# Patient Record
Sex: FEMALE | Race: WHITE
[De-identification: names, ages, dates, MRNs, and addresses within clinical notes are randomized per-mention and may not be internally consistent; named-entity substitution may affect disease eponyms.]

---

## 2021-08-17 ENCOUNTER — HOSPITAL ENCOUNTER (OUTPATIENT)
Dept: HOSPITAL 96 - M.LAB | Age: 71
End: 2021-08-17
Attending: NURSE PRACTITIONER
Payer: COMMERCIAL

## 2021-08-17 DIAGNOSIS — I10: Primary | ICD-10-CM

## 2021-08-17 DIAGNOSIS — E87.6: ICD-10-CM

## 2021-08-17 DIAGNOSIS — E78.2: ICD-10-CM

## 2021-08-17 LAB
ALBUMIN SERPL-MCNC: 3.8 G/DL (ref 3.4–5)
ALP SERPL-CCNC: 72 U/L (ref 46–116)
ALT SERPL-CCNC: 27 U/L (ref 30–65)
ANION GAP SERPL CALC-SCNC: 6 MMOL/L (ref 7–16)
AST SERPL-CCNC: 14 U/L (ref 15–37)
BILIRUB SERPL-MCNC: 0.3 MG/DL
BUN SERPL-MCNC: 17 MG/DL (ref 7–18)
CALCIUM SERPL-MCNC: 9 MG/DL (ref 8.5–10.1)
CHLORIDE SERPL-SCNC: 100 MMOL/L (ref 98–107)
CHOLEST SERPL-MCNC: 138 MG/DL (ref ?–200)
CO2 SERPL-SCNC: 33 MMOL/L (ref 21–32)
CREAT SERPL-MCNC: 1.1 MG/DL (ref 0.6–1.3)
GLUCOSE SERPL-MCNC: 308 MG/DL (ref 70–99)
HDLC SERPL-MCNC: 42 MG/DL (ref 40–?)
LDLC SERPL-MCNC: 58 MG/DL (ref ?–100)
POTASSIUM SERPL-SCNC: 4.5 MMOL/L (ref 3.5–5.1)
PROT SERPL-MCNC: 7.3 G/DL (ref 6.4–8.2)
SODIUM SERPL-SCNC: 139 MMOL/L (ref 136–145)
TC:HDL: 3.3 RATIO
TRIGL SERPL-MCNC: 193 MG/DL (ref ?–150)
VLDLC SERPL CALC-MCNC: 39 MG/DL (ref ?–40)

## 2022-01-08 ENCOUNTER — HOSPITAL ENCOUNTER (INPATIENT)
Dept: HOSPITAL 96 - M.ERS | Age: 72
LOS: 7 days | Discharge: HOME | DRG: 246 | End: 2022-01-15
Attending: INTERNAL MEDICINE | Admitting: INTERNAL MEDICINE
Payer: COMMERCIAL

## 2022-01-08 VITALS — HEIGHT: 62.99 IN | BODY MASS INDEX: 38.98 KG/M2 | WEIGHT: 220 LBS

## 2022-01-08 VITALS — DIASTOLIC BLOOD PRESSURE: 126 MMHG | SYSTOLIC BLOOD PRESSURE: 151 MMHG

## 2022-01-08 DIAGNOSIS — E11.9: ICD-10-CM

## 2022-01-08 DIAGNOSIS — Z79.899: ICD-10-CM

## 2022-01-08 DIAGNOSIS — J96.21: ICD-10-CM

## 2022-01-08 DIAGNOSIS — Z79.82: ICD-10-CM

## 2022-01-08 DIAGNOSIS — I10: ICD-10-CM

## 2022-01-08 DIAGNOSIS — Z20.822: ICD-10-CM

## 2022-01-08 DIAGNOSIS — Z90.710: ICD-10-CM
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
## 2022-01-08 DIAGNOSIS — E78.5: ICD-10-CM

## 2022-01-08 DIAGNOSIS — Z90.49: ICD-10-CM

## 2022-01-08 DIAGNOSIS — E66.01: ICD-10-CM

## 2022-01-08 DIAGNOSIS — I25.10: ICD-10-CM

## 2022-01-08 DIAGNOSIS — J96.22: ICD-10-CM

## 2022-01-08 DIAGNOSIS — J44.1: ICD-10-CM

## 2022-01-08 DIAGNOSIS — I25.2: ICD-10-CM

## 2022-01-08 DIAGNOSIS — R65.11: ICD-10-CM

## 2022-01-08 DIAGNOSIS — N39.0: ICD-10-CM

## 2022-01-08 DIAGNOSIS — Z86.73: ICD-10-CM

## 2022-01-08 DIAGNOSIS — I21.4: Primary | ICD-10-CM

## 2022-01-08 DIAGNOSIS — T50.905A: ICD-10-CM

## 2022-01-08 DIAGNOSIS — Z95.5: ICD-10-CM

## 2022-01-08 LAB
ABSOLUTE BASOPHILS: 0.1 THOU/UL (ref 0–0.2)
ABSOLUTE EOSINOPHILS: 0.1 THOU/UL (ref 0–0.7)
ABSOLUTE MONOCYTES: 0.4 THOU/UL (ref 0–1.2)
ALBUMIN SERPL-MCNC: 3.2 G/DL (ref 3.4–5)
ALP SERPL-CCNC: 63 U/L (ref 46–116)
ALT SERPL-CCNC: 16 U/L (ref 30–65)
ANION GAP SERPL CALC-SCNC: 7 MMOL/L (ref 7–16)
AST SERPL-CCNC: 15 U/L (ref 15–37)
BASOPHILS NFR BLD AUTO: 0.7 %
BILIRUB SERPL-MCNC: 0.2 MG/DL
BILIRUB UR-MCNC: NEGATIVE MG/DL
BUN SERPL-MCNC: 21 MG/DL (ref 7–18)
CALCIUM SERPL-MCNC: 8.3 MG/DL (ref 8.5–10.1)
CHLORIDE SERPL-SCNC: 103 MMOL/L (ref 98–107)
CO2 SERPL-SCNC: 30 MMOL/L (ref 21–32)
COLOR UR: YELLOW
CREAT SERPL-MCNC: 1.3 MG/DL (ref 0.6–1.3)
EOSINOPHIL NFR BLD: 1.4 %
GLUCOSE SERPL-MCNC: 225 MG/DL (ref 70–99)
GRANULOCYTES NFR BLD MANUAL: 78.1 %
HCT VFR BLD CALC: 36.9 % (ref 37–47)
HGB BLD-MCNC: 11.7 GM/DL (ref 12–15)
KETONES UR STRIP-MCNC: (no result) MG/DL
LYMPHOCYTES # BLD: 1.3 THOU/UL (ref 0.8–5.3)
LYMPHOCYTES NFR BLD AUTO: 15.2 %
MAGNESIUM SERPL-MCNC: 2.1 MG/DL (ref 1.8–2.4)
MCH RBC QN AUTO: 29.6 PG (ref 26–34)
MCHC RBC AUTO-ENTMCNC: 31.8 G/DL (ref 28–37)
MCV RBC: 93.2 FL (ref 80–100)
MONOCYTES NFR BLD: 4.6 %
MPV: 7.9 FL. (ref 7.2–11.1)
NEUTROPHILS # BLD: 6.5 THOU/UL (ref 1.6–8.1)
NT-PRO BRAIN NAT PEPTIDE: 1009 PG/ML (ref ?–300)
NUCLEATED RBCS: 0 /100WBC
PLATELET COUNT*: 250 THOU/UL (ref 150–400)
POTASSIUM SERPL-SCNC: 4.2 MMOL/L (ref 3.5–5.1)
PROT SERPL-MCNC: 6.9 G/DL (ref 6.4–8.2)
PROT UR QL STRIP: (no result)
RBC # BLD AUTO: 3.96 MIL/UL (ref 4.2–5)
RBC # UR STRIP: (no result) /UL
RDW-CV: 15.2 % (ref 10.5–14.5)
SODIUM SERPL-SCNC: 140 MMOL/L (ref 136–145)
SP GR UR STRIP: >= 1.03 (ref 1–1.03)
URINE CLARITY: CLEAR
URINE GLUCOSE-RANDOM: (no result)
URINE LEUKOCYTES-REFLEX: NEGATIVE
URINE NITRITE-REFLEX: NEGATIVE
UROBILINOGEN UR STRIP-ACNC: 0.2 E.U./DL (ref 0.2–1)
WBC # BLD AUTO: 8.3 THOU/UL (ref 4–11)

## 2022-01-09 VITALS — SYSTOLIC BLOOD PRESSURE: 141 MMHG | DIASTOLIC BLOOD PRESSURE: 62 MMHG

## 2022-01-09 VITALS — DIASTOLIC BLOOD PRESSURE: 52 MMHG | SYSTOLIC BLOOD PRESSURE: 117 MMHG

## 2022-01-09 VITALS — DIASTOLIC BLOOD PRESSURE: 49 MMHG | SYSTOLIC BLOOD PRESSURE: 130 MMHG

## 2022-01-09 VITALS — SYSTOLIC BLOOD PRESSURE: 135 MMHG | DIASTOLIC BLOOD PRESSURE: 42 MMHG

## 2022-01-09 VITALS — DIASTOLIC BLOOD PRESSURE: 74 MMHG | SYSTOLIC BLOOD PRESSURE: 155 MMHG

## 2022-01-09 VITALS — DIASTOLIC BLOOD PRESSURE: 42 MMHG | SYSTOLIC BLOOD PRESSURE: 135 MMHG

## 2022-01-09 VITALS — SYSTOLIC BLOOD PRESSURE: 133 MMHG | DIASTOLIC BLOOD PRESSURE: 57 MMHG

## 2022-01-09 VITALS — SYSTOLIC BLOOD PRESSURE: 148 MMHG | DIASTOLIC BLOOD PRESSURE: 66 MMHG

## 2022-01-09 LAB
APTT BLD: 22.9 SECONDS (ref 25–31.3)
BACTERIA-REFLEX: (no result) /HPF
CHOLEST SERPL-MCNC: 150 MG/DL (ref ?–200)
HDLC SERPL-MCNC: 59 MG/DL (ref 40–?)
INR PPP: 1
LDLC SERPL-MCNC: 82 MG/DL (ref ?–100)
MUCUS: (no result) STRN/LPF
PROTHROMBIN TIME: 10.3 SECONDS (ref 9.2–11.5)
SQUAMOUS: (no result) /LPF (ref 0–3)
TC:HDL: 2.5 RATIO
TRIGL SERPL-MCNC: 47 MG/DL (ref ?–150)
VLDLC SERPL CALC-MCNC: 9 MG/DL (ref ?–40)

## 2022-01-09 NOTE — EKG
[place] [address]
Phone:  [phone]                     ELECTROCARDIOGRAM REPORT      
_______________________________________________________________________________
 
Name:         KIMBERLY VILLEDAABHINAV ALFARO            Room:          [address]
M.R.#:    [id number]     Account #:     [id number]  
Admission:    22    Attend Phys:   Ruddy Quiroga, 
Discharge:                Date of Birth: [birth year]  
Date of Service: 22 0557  Report #:      [id number]
        [id number]
_______________________________________________________________________________
THIS REPORT FOR:  //name//                      
 
                         [facility] ED
                                       
Test Date:    2022               Test Time:    05:57:57
Pat Name:     KVNG VILLEDA           Department:   
Patient ID:   [id number]            Room:         [place] [address]
Gender:       F                        Technician:   CARTER
:          1950               Requested By: Lacey Hammond
Order Number: [id number] MD:   Erick Ann
                                 Measurements
Intervals                              Axis          
Rate:         95                       P:            51
AL:           142                      QRS:          88
QRSD:         130                      T:            -12
QT:           383                                    
QTc:          482                                    
                           Interpretive Statements
Sinus rhythm
Probable left atrial enlargement
Right bundle branch block
Compared to ECG 2022 19:57:13
Sinus tachycardia no longer present
Electronically Signed On 2022 10:19:57 CST by Erick Ann
[email or web address]
 
 
 
 
 
 
 
 
 
 
 
 
 
 
 
 
 
 
 
  <ELECTRONICALLY SIGNED>
                                           By: Erick Ann MD, [facility]      
  22     1019
D: 22 0557   _____________________________________
T: 22 0557   Erick Ann MD, [facility]        /EPI

## 2022-01-09 NOTE — EKG
[place] [address]
Phone:  [phone]                     ELECTROCARDIOGRAM REPORT      
_______________________________________________________________________________
 
Name:         KVNG VILLEDA ANGELINA            Room:          [address]
M.R.#:    [id number]     Account #:     [id number]  
Admission:    22    Attend Phys:   Ruddy Quiroga, 
Discharge:                Date of Birth: [birth year]  
Date of Service: 22  Report #:      [id number]
        [id number]
_______________________________________________________________________________
THIS REPORT FOR:  //name//                      
 
                         [facility] ED
                                       
Test Date:    2022               Test Time:    19:57:13
Pat Name:     KVNG VILLEDA           Department:   
Patient ID:   [id number]            Room:         [facility]
Gender:       F                        Technician:   FL
:          1950               Requested By: Lacey Hammond
Order Number: [id number] MD:   Erick Ann
                                 Measurements
Intervals                              Axis          
Rate:         101                      P:            50
[place]:           126                      QRS:          90
QRSD:         130                      T:            -10
QT:           383                                    
QTc:          497                                    
                           Interpretive Statements
Sinus tachycardia
Probable left atrial enlargement
RBBB and LPFB
Compared to ECG 2018 13:06:52
Left posterior fascicular block now present
rate has increased
Electronically Signed On 2022 10:10:22 CST by Erick Ann
[email or web address]
 
 
 
 
 
 
 
 
 
 
 
 
 
 
 
 
 
 
  <ELECTRONICALLY SIGNED>
                                           By: Erick Ann MD, [facility]      
  22     1010
D: 22   _____________________________________
T: 22   Erick Ann MD, [facility]        /EPI

## 2022-01-10 VITALS — SYSTOLIC BLOOD PRESSURE: 104 MMHG | DIASTOLIC BLOOD PRESSURE: 44 MMHG

## 2022-01-10 VITALS — SYSTOLIC BLOOD PRESSURE: 128 MMHG | DIASTOLIC BLOOD PRESSURE: 45 MMHG

## 2022-01-10 VITALS — DIASTOLIC BLOOD PRESSURE: 49 MMHG | SYSTOLIC BLOOD PRESSURE: 120 MMHG

## 2022-01-10 VITALS — DIASTOLIC BLOOD PRESSURE: 45 MMHG | SYSTOLIC BLOOD PRESSURE: 128 MMHG

## 2022-01-10 VITALS — SYSTOLIC BLOOD PRESSURE: 116 MMHG | DIASTOLIC BLOOD PRESSURE: 49 MMHG

## 2022-01-10 VITALS — SYSTOLIC BLOOD PRESSURE: 110 MMHG | DIASTOLIC BLOOD PRESSURE: 57 MMHG

## 2022-01-10 VITALS — SYSTOLIC BLOOD PRESSURE: 148 MMHG | DIASTOLIC BLOOD PRESSURE: 55 MMHG

## 2022-01-10 LAB
ABSOLUTE BASOPHILS: 0.1 THOU/UL (ref 0–0.2)
ABSOLUTE EOSINOPHILS: 0.2 THOU/UL (ref 0–0.7)
ABSOLUTE MONOCYTES: 0.5 THOU/UL (ref 0–1.2)
BASOPHILS NFR BLD AUTO: 0.9 %
EOSINOPHIL NFR BLD: 1.9 %
GRANULOCYTES NFR BLD MANUAL: 62.4 %
HCT VFR BLD CALC: 31.6 % (ref 37–47)
HGB BLD-MCNC: 10.3 GM/DL (ref 12–15)
LYMPHOCYTES # BLD: 2.4 THOU/UL (ref 0.8–5.3)
LYMPHOCYTES NFR BLD AUTO: 28.8 %
MCH RBC QN AUTO: 30.2 PG (ref 26–34)
MCHC RBC AUTO-ENTMCNC: 32.6 G/DL (ref 28–37)
MCV RBC: 92.7 FL (ref 80–100)
MONOCYTES NFR BLD: 6 %
MPV: 8 FL. (ref 7.2–11.1)
NEUTROPHILS # BLD: 5.3 THOU/UL (ref 1.6–8.1)
NUCLEATED RBCS: 0 /100WBC
PLATELET COUNT*: 237 THOU/UL (ref 150–400)
RBC # BLD AUTO: 3.4 MIL/UL (ref 4.2–5)
RDW-CV: 15 % (ref 10.5–14.5)
WBC # BLD AUTO: 8.5 THOU/UL (ref 4–11)

## 2022-01-10 NOTE — EKG
[place] [address]
Phone:  [phone]                     ELECTROCARDIOGRAM REPORT      
_______________________________________________________________________________
 
Name:         KVNG VILLEDA            Room:          [address]#:    [id number]     Account #:     [id number]  
Admission:    22    Attend Phys:   Ruddy Quiroga, 
Discharge:                Date of Birth: [birth year]  
Date of Service: 22 0853  Report #:      [id number]
        [id number]
_______________________________________________________________________________
THIS REPORT FOR:  //name//                      
 
                         [facility] ED
                                       
Test Date:    2022               Test Time:    08:53:53
Pat Name:     KVNG VILLEDA           Department:   
Patient ID:   [id number]            Room:         [address]
Gender:       F                        Technician:   
:          1950               Requested By: Ruddy Quiroga
Order Number: [id number]    Reading MD:   Orville Hogan
                                 Measurements
Intervals                              Axis          
Rate:         115                      P:            71
VA:           137                      QRS:          91
QRSD:         137                      T:            -18
QT:           352                                    
QTc:          487                                    
                           Interpretive Statements
Sinus tachycardia
Probable left atrial enlargement
RBBB and LPFB
Repol abnrm suggests ischemia, diffuse leads
Compared to ECG 2022 05:57:57
Left posterior fascicular block now present
Possible ischemia now present
Sinus rate has increased
Electronically Signed On 1- 13:14:50 CST by Orville Hogan
[email or web address]
 
 
 
 
 
 
 
 
 
 
 
 
 
 
 
 
  <ELECTRONICALLY SIGNED>
                                           By: Orville Hogan MD, [facility]     
  01/10/22     1314
D: 22 0853   _____________________________________
T: 22 0853   Orville Hogan MD, [facility]       /EPI

## 2022-01-11 VITALS — DIASTOLIC BLOOD PRESSURE: 56 MMHG | SYSTOLIC BLOOD PRESSURE: 140 MMHG

## 2022-01-11 VITALS — DIASTOLIC BLOOD PRESSURE: 56 MMHG | SYSTOLIC BLOOD PRESSURE: 143 MMHG

## 2022-01-11 VITALS — DIASTOLIC BLOOD PRESSURE: 52 MMHG | SYSTOLIC BLOOD PRESSURE: 134 MMHG

## 2022-01-11 VITALS — SYSTOLIC BLOOD PRESSURE: 119 MMHG | DIASTOLIC BLOOD PRESSURE: 39 MMHG

## 2022-01-11 VITALS — SYSTOLIC BLOOD PRESSURE: 123 MMHG | DIASTOLIC BLOOD PRESSURE: 44 MMHG

## 2022-01-11 VITALS — SYSTOLIC BLOOD PRESSURE: 123 MMHG | DIASTOLIC BLOOD PRESSURE: 57 MMHG

## 2022-01-11 VITALS — SYSTOLIC BLOOD PRESSURE: 143 MMHG | DIASTOLIC BLOOD PRESSURE: 57 MMHG

## 2022-01-11 VITALS — DIASTOLIC BLOOD PRESSURE: 48 MMHG | SYSTOLIC BLOOD PRESSURE: 140 MMHG

## 2022-01-11 VITALS — SYSTOLIC BLOOD PRESSURE: 139 MMHG | DIASTOLIC BLOOD PRESSURE: 59 MMHG

## 2022-01-11 VITALS — DIASTOLIC BLOOD PRESSURE: 62 MMHG | SYSTOLIC BLOOD PRESSURE: 138 MMHG

## 2022-01-11 VITALS — DIASTOLIC BLOOD PRESSURE: 54 MMHG | SYSTOLIC BLOOD PRESSURE: 122 MMHG

## 2022-01-11 VITALS — SYSTOLIC BLOOD PRESSURE: 127 MMHG | DIASTOLIC BLOOD PRESSURE: 66 MMHG

## 2022-01-11 VITALS — DIASTOLIC BLOOD PRESSURE: 44 MMHG | SYSTOLIC BLOOD PRESSURE: 120 MMHG

## 2022-01-11 VITALS — SYSTOLIC BLOOD PRESSURE: 132 MMHG | DIASTOLIC BLOOD PRESSURE: 44 MMHG

## 2022-01-11 VITALS — SYSTOLIC BLOOD PRESSURE: 119 MMHG | DIASTOLIC BLOOD PRESSURE: 44 MMHG

## 2022-01-11 PROCEDURE — B211YZZ FLUOROSCOPY OF MULTIPLE CORONARY ARTERIES USING OTHER CONTRAST: ICD-10-PCS

## 2022-01-11 PROCEDURE — 4A023N7 MEASUREMENT OF CARDIAC SAMPLING AND PRESSURE, LEFT HEART, PERCUTANEOUS APPROACH: ICD-10-PCS | Performed by: INTERNAL MEDICINE

## 2022-01-12 VITALS — DIASTOLIC BLOOD PRESSURE: 54 MMHG | SYSTOLIC BLOOD PRESSURE: 156 MMHG

## 2022-01-12 VITALS — DIASTOLIC BLOOD PRESSURE: 53 MMHG | SYSTOLIC BLOOD PRESSURE: 145 MMHG

## 2022-01-12 VITALS — SYSTOLIC BLOOD PRESSURE: 137 MMHG | DIASTOLIC BLOOD PRESSURE: 61 MMHG

## 2022-01-12 VITALS — SYSTOLIC BLOOD PRESSURE: 157 MMHG | DIASTOLIC BLOOD PRESSURE: 61 MMHG

## 2022-01-12 VITALS — DIASTOLIC BLOOD PRESSURE: 66 MMHG | SYSTOLIC BLOOD PRESSURE: 142 MMHG

## 2022-01-12 VITALS — DIASTOLIC BLOOD PRESSURE: 77 MMHG | SYSTOLIC BLOOD PRESSURE: 136 MMHG

## 2022-01-12 LAB
ABSOLUTE BASOPHILS: 0.1 THOU/UL (ref 0–0.2)
ABSOLUTE EOSINOPHILS: 0.2 THOU/UL (ref 0–0.7)
ABSOLUTE MONOCYTES: 0.5 THOU/UL (ref 0–1.2)
ANION GAP SERPL CALC-SCNC: 6 MMOL/L (ref 7–16)
BASOPHILS NFR BLD AUTO: 1.1 %
BE: 2 MMOL/L
BUN SERPL-MCNC: 13 MG/DL (ref 7–18)
CALCIUM SERPL-MCNC: 8.2 MG/DL (ref 8.5–10.1)
CHLORIDE SERPL-SCNC: 104 MMOL/L (ref 98–107)
CO2 SERPL-SCNC: 30 MMOL/L (ref 21–32)
CREAT SERPL-MCNC: 0.8 MG/DL (ref 0.6–1.3)
EOSINOPHIL NFR BLD: 3.6 %
GLUCOSE SERPL-MCNC: 141 MG/DL (ref 70–99)
GRANULOCYTES NFR BLD MANUAL: 62.5 %
HCT VFR BLD CALC: 32 % (ref 37–47)
HGB BLD-MCNC: 10.3 GM/DL (ref 12–15)
LYMPHOCYTES # BLD: 1.8 THOU/UL (ref 0.8–5.3)
LYMPHOCYTES NFR BLD AUTO: 25.9 %
MCH RBC QN AUTO: 29.8 PG (ref 26–34)
MCHC RBC AUTO-ENTMCNC: 32.2 G/DL (ref 28–37)
MCV RBC: 92.6 FL (ref 80–100)
MONOCYTES NFR BLD: 6.9 %
MPV: 9.1 FL. (ref 7.2–11.1)
NEUTROPHILS # BLD: 4.3 THOU/UL (ref 1.6–8.1)
NUCLEATED RBCS: 0 /100WBC
PCO2 BLD: 51.3 MMHG (ref 35–45)
PLATELET COUNT*: 230 THOU/UL (ref 150–400)
PO2 BLD: 97 MMHG (ref 75–100)
POTASSIUM SERPL-SCNC: 4.3 MMOL/L (ref 3.5–5.1)
RBC # BLD AUTO: 3.46 MIL/UL (ref 4.2–5)
RDW-CV: 14.9 % (ref 10.5–14.5)
SODIUM SERPL-SCNC: 140 MMOL/L (ref 136–145)
WBC # BLD AUTO: 6.8 THOU/UL (ref 4–11)

## 2022-01-12 NOTE — EKG
[place] [address]
Phone:  [phone]                     ELECTROCARDIOGRAM REPORT      
_______________________________________________________________________________
 
Name:         CHRISTIANAKVNGABHINAV ALFARO            Room:          [address]    ADM [place] 
..#:    [id number]     Account #:     [id number]  
Admission:    01/10/22    Attend Phys:   Ruddy Quiroga, 
Discharge:                Date of Birth: [birth year]  
Date of Service: 22 1012  Report #:      [id number]
        [id number]
_______________________________________________________________________________
THIS REPORT FOR:  //name//                      
 
                          [facility]
                                       
Test Date:    2022               Test Time:    10:12:34
Pat Name:     KVNG VILLEDA           Department:   
Patient ID:   [id number]            Room:         [address]
Gender:       F                        Technician:   SUSY
:          1950               Requested By: Marcus Salgado
Order Number: [id number]    Alesha MD:   Orville Hogan
                                 Measurements
Intervals                              Axis          
Rate:         76                       P:            53
DC:           125                      QRS:          88
QRSD:         140                      T:            -36
QT:           416                                    
QTc:          468                                    
                           Interpretive Statements
Sinus rhythm
Probable left atrial enlargement
Right bundle branch block with right axis deviation
Baseline wander in lead(s) V5,V6
Compared to ECG 2022 08:53:53
Sinus tachycardia no longer present
Right axis deviation persists
Early repolarization no longer present
Nonspecific ST-T alterations persist
Electronically Signed On 2022 13:49:09 CST by Orville Hogan
[email or web address]
 
 
 
 
 
 
 
 
 
 
 
 
 
 
 
  <ELECTRONICALLY SIGNED>
                                           By: Orville Hogan MD, [facility]     
  22     1349
D: 22 1012   _____________________________________
T: 22 1012   Orville Hogan MD, [facility]       /EPI

## 2022-01-13 VITALS — DIASTOLIC BLOOD PRESSURE: 40 MMHG | SYSTOLIC BLOOD PRESSURE: 113 MMHG

## 2022-01-13 VITALS — DIASTOLIC BLOOD PRESSURE: 59 MMHG | SYSTOLIC BLOOD PRESSURE: 146 MMHG

## 2022-01-13 VITALS — SYSTOLIC BLOOD PRESSURE: 193 MMHG | DIASTOLIC BLOOD PRESSURE: 80 MMHG

## 2022-01-13 VITALS — DIASTOLIC BLOOD PRESSURE: 36 MMHG | SYSTOLIC BLOOD PRESSURE: 116 MMHG

## 2022-01-13 VITALS — DIASTOLIC BLOOD PRESSURE: 54 MMHG | SYSTOLIC BLOOD PRESSURE: 114 MMHG

## 2022-01-13 VITALS — DIASTOLIC BLOOD PRESSURE: 81 MMHG | SYSTOLIC BLOOD PRESSURE: 157 MMHG

## 2022-01-13 VITALS — SYSTOLIC BLOOD PRESSURE: 121 MMHG | DIASTOLIC BLOOD PRESSURE: 39 MMHG

## 2022-01-13 VITALS — SYSTOLIC BLOOD PRESSURE: 113 MMHG | DIASTOLIC BLOOD PRESSURE: 40 MMHG

## 2022-01-13 VITALS — DIASTOLIC BLOOD PRESSURE: 40 MMHG | SYSTOLIC BLOOD PRESSURE: 112 MMHG

## 2022-01-13 VITALS — SYSTOLIC BLOOD PRESSURE: 118 MMHG | DIASTOLIC BLOOD PRESSURE: 38 MMHG

## 2022-01-13 VITALS — DIASTOLIC BLOOD PRESSURE: 55 MMHG | SYSTOLIC BLOOD PRESSURE: 156 MMHG

## 2022-01-13 VITALS — DIASTOLIC BLOOD PRESSURE: 35 MMHG | SYSTOLIC BLOOD PRESSURE: 114 MMHG

## 2022-01-13 VITALS — DIASTOLIC BLOOD PRESSURE: 45 MMHG | SYSTOLIC BLOOD PRESSURE: 130 MMHG

## 2022-01-13 VITALS — SYSTOLIC BLOOD PRESSURE: 167 MMHG | DIASTOLIC BLOOD PRESSURE: 65 MMHG

## 2022-01-13 LAB
ABSOLUTE BASOPHILS: 0 THOU/UL (ref 0–0.2)
ABSOLUTE EOSINOPHILS: 0.3 THOU/UL (ref 0–0.7)
ABSOLUTE MONOCYTES: 0.5 THOU/UL (ref 0–1.2)
ANION GAP SERPL CALC-SCNC: 4 MMOL/L (ref 7–16)
BASOPHILS NFR BLD AUTO: 0.3 %
BE: 3.5 MMOL/L
BUN SERPL-MCNC: 9 MG/DL (ref 7–18)
CALCIUM SERPL-MCNC: 8.4 MG/DL (ref 8.5–10.1)
CHLORIDE SERPL-SCNC: 104 MMOL/L (ref 98–107)
CO2 SERPL-SCNC: 32 MMOL/L (ref 21–32)
CREAT SERPL-MCNC: 0.7 MG/DL (ref 0.6–1.3)
EOSINOPHIL NFR BLD: 4.3 %
GLUCOSE SERPL-MCNC: 108 MG/DL (ref 70–99)
GRANULOCYTES NFR BLD MANUAL: 65.1 %
HCT VFR BLD CALC: 32.6 % (ref 37–47)
HGB BLD-MCNC: 10.3 GM/DL (ref 12–15)
LYMPHOCYTES # BLD: 1.6 THOU/UL (ref 0.8–5.3)
LYMPHOCYTES NFR BLD AUTO: 23.7 %
MCH RBC QN AUTO: 29.6 PG (ref 26–34)
MCHC RBC AUTO-ENTMCNC: 31.7 G/DL (ref 28–37)
MCV RBC: 93.5 FL (ref 80–100)
MONOCYTES NFR BLD: 6.6 %
MPV: 8.1 FL. (ref 7.2–11.1)
NEUTROPHILS # BLD: 4.5 THOU/UL (ref 1.6–8.1)
NUCLEATED RBCS: 0 /100WBC
PCO2 BLD: 57.8 MMHG (ref 35–45)
PLATELET COUNT*: 219 THOU/UL (ref 150–400)
PO2 BLD: 108.4 MMHG (ref 75–100)
POTASSIUM SERPL-SCNC: 4.1 MMOL/L (ref 3.5–5.1)
RBC # BLD AUTO: 3.49 MIL/UL (ref 4.2–5)
RDW-CV: 14.5 % (ref 10.5–14.5)
SODIUM SERPL-SCNC: 140 MMOL/L (ref 136–145)
WBC # BLD AUTO: 6.9 THOU/UL (ref 4–11)

## 2022-01-13 PROCEDURE — 5A09357 ASSISTANCE WITH RESPIRATORY VENTILATION, LESS THAN 24 CONSECUTIVE HOURS, CONTINUOUS POSITIVE AIRWAY PRESSURE: ICD-10-PCS | Performed by: INTERNAL MEDICINE

## 2022-01-13 PROCEDURE — B41FYZZ FLUOROSCOPY OF RIGHT LOWER EXTREMITY ARTERIES USING OTHER CONTRAST: ICD-10-PCS

## 2022-01-13 PROCEDURE — B211YZZ FLUOROSCOPY OF MULTIPLE CORONARY ARTERIES USING OTHER CONTRAST: ICD-10-PCS

## 2022-01-13 PROCEDURE — 4A023N7 MEASUREMENT OF CARDIAC SAMPLING AND PRESSURE, LEFT HEART, PERCUTANEOUS APPROACH: ICD-10-PCS

## 2022-01-13 PROCEDURE — 5A0935A ASSISTANCE WITH RESPIRATORY VENTILATION, LESS THAN 24 CONSECUTIVE HOURS, HIGH NASAL FLOW/VELOCITY: ICD-10-PCS

## 2022-01-13 PROCEDURE — 027034Z DILATION OF CORONARY ARTERY, ONE ARTERY WITH DRUG-ELUTING INTRALUMINAL DEVICE, PERCUTANEOUS APPROACH: ICD-10-PCS | Performed by: INTERNAL MEDICINE

## 2022-01-13 NOTE — CARD
[address]  [address]                    CARDIAC CATH REPORT           
_______________________________________________________________________________
 
Name:       KVNG VILLEDA             Room:           [address] [place]  
.R.#:  [id number]      Account #:      [id number]  
Admission:  01/10/22     Attend Phys:    Ruddy Quiroga MD 
Discharge:               Date of Birth:  [birth year]  
         Report #: [id number]
                                                                     [id number]
_______________________________________________________________________________
THIS REPORT FOR:  
 
cc:  Karlos Iraheta Brad DO Holkins,Orville VILLA MD [facility]                                           ~
 
 
--------------- APPROVED REPORT --------------
 
 
Study performed:  01/13/2022 08:49:56
 
Patient Details
Patient Status: In-Patient                  Room #: 221
The patient is a 71 year-old female
 
Event Personnel
Purnima Crawford RTR, Dr Benson, Dr. Hogan, Ron Kay RN, Angie Gonzalez RTR
 
Procedures Performed
Staged PCI of mid-right coronary
 
Previous Procedures/Diagnoses
Previous PCI
 
Procedure Narrative
The patient was brought electively to the Cardiac Catheterization 
Laboratory and was prepped and draped in a sterile manner. The right 
femoral was infiltrated with 2% Lidocaine subcutaneous anesthesia. IV 
conscious sedation was used throughout procedure with appropriate 
monitoring and was performed in the presence of a registered nurse 
who was an independent trained observer other than the physician 
performing the procedure. The right femoral accessed via ultrasound 
guidance.  A 6Fr [place] sheath was inserted into the right femoral 
artery. Coronary angiography was performed using coronary diagnostic 
catheters. The right coronary system was accessed and visualized with 
a 6Fr JR4 Guide catheter. Pre-demployment femoral angiogram was 
performed in PERES. Closure device was deployed with a 6 Fr Angioseal. 
There was no hematoma.
 
Intraoperative Conscious Sedation
Sedation start time:  0928           Case end Time:  
1023    
Fentanyl  25.0 mcg    Versed  3.0 mg  
 
 
 
 
[place] [address]                    CARDIAC CATH REPORT           
_______________________________________________________________________________
 
Name:       KVNG VILLEDA             Room:           [address] [place]  
[facility]#:  [id number]      Account #:      [id number]  
Admission:  01/10/22     Attend Phys:    Ruddy Quiroga MD 
Discharge:               Date of Birth:  [birth year]  
         Report #: [id number]
                                                                     [id number]
_______________________________________________________________________________
 
Fluoro Time:    18.8 minutes     
Dose:     DAP 693691 cGycm2  1785 mGy  
Contrast Type and Amount:  Visipaque 160 ml    
 
Coronary Angiography
The patient's coronary anatomy is right dominant. 
 
Diagnostic Cath
Right Coronary 40% irregular proximal stenosis with 95% heavily 
calcified mid vessel stenosis and 50% narrowing at the acute 
margin
 
Left Ventriculography
Left Ventriculography was not performed.     
 
Hemodynamics
The aortic pressure is 117/46 mmHg with a mean of 72 mmHg. 
 
PCI Technique Lesion
Anticoagulation was achieved with Angiomax. Percutaneous coronary 
intervention was performed on the mid right coronary artery. The 
lesion stenosis prior to intervention was 95% with YOLANDA 3 flow. A 6Fr 
JR4 Guide Catheter was used to engage the Right ostium. A BMW 190cm 
Interventional Guidewire was used to cross the lesion.
 
BALLOON DILATION
A Balloon catheter 1.5 x 12 mini Trek was inserted and inflated up to 
16atm for 13seconds. Additional Inflation: 20atm for 12seconds. 2.25 
x 12 trek inflated to 14 to 16 michi; 2.5 x 12 NC trek inflated to 16 
to 18 michi; 2.75 x 12 NC trek inflated to 16-18
 
STENT DEPLOYMENT
A 2.5 x 12 Pedro CRISTOBAL stent was inserted and inflated up to 12atm for 
13seconds. Additional Inflation: 16atm for 9seconds.
 
POST STENT DEPLOYMENT BALLOON DILATION
A Balloon catheter 2.75 x 8 NC Trek was inserted and inflated up to 
16atm for 14seconds. Additional Inflation: 18atm for 9seconds.
 
Final angiography reveals 10 % stenosis with YOLANDA 3 
flow.
 
COMMENTS
The PCI was technically complex by virtue of severe calcification of 
the mid right coronary stenosis requiring complex wiring and 
 
 
 
[place] [address]                    CARDIAC CATH REPORT           
_______________________________________________________________________________
 
Name:       KVNG VILLEDA             Room:           [address] [place]  
[facility]#:  [id number]      Account #:      [id number]  
Admission:  01/10/22     Attend Phys:    Ruddy Quiroga MD 
Discharge:               Date of Birth:  [birth year]  
         Report #: [id number]
                                                                     [id number]
_______________________________________________________________________________
 
significant lesion preparation prior to drug-eluting stent 
deployment.
 
Conclusion
1.  Severe right coronary stenosis with 40% proximal and 95% heavily 
calcified mid and 50% acute marginal stenoses
 
2.  Normal systemic pressure throughout the study
 
3.  Successful PCI with deployment of drug-eluting stent at the site 
of 95% heavily calcified mid right coronary stenosis with 10% 
residual narrowing and YOLANDA-3 flow to the distal vessel 
 
Recommendations
Cardiac Risk Reduction Program
Aggressive Medical Therapy
 
Medications Administered
Aggrastat bolus and infusion 
 
Diagnostic Cath Approved by: Orville Hogan MD        Date/Time: 
01/13/2022 14:53:30
 
 
 
 
 
 
 
 
 
 
 
 
 
 
 
 
 
 
 
 
 
<ELECTRONICALLY SIGNED>
                                        By:  Orville Hogan MD, [facility]     
01/13/22     1457
D: 01/13/22 1457_______________________________________
T: 01/13/22 1457Orville Hogan MD, [facility]        /INF

## 2022-01-14 VITALS — DIASTOLIC BLOOD PRESSURE: 54 MMHG | SYSTOLIC BLOOD PRESSURE: 146 MMHG

## 2022-01-14 VITALS — DIASTOLIC BLOOD PRESSURE: 44 MMHG | SYSTOLIC BLOOD PRESSURE: 118 MMHG

## 2022-01-14 VITALS — SYSTOLIC BLOOD PRESSURE: 128 MMHG | DIASTOLIC BLOOD PRESSURE: 45 MMHG

## 2022-01-14 VITALS — SYSTOLIC BLOOD PRESSURE: 123 MMHG | DIASTOLIC BLOOD PRESSURE: 47 MMHG

## 2022-01-14 VITALS — SYSTOLIC BLOOD PRESSURE: 113 MMHG | DIASTOLIC BLOOD PRESSURE: 40 MMHG

## 2022-01-14 VITALS — SYSTOLIC BLOOD PRESSURE: 145 MMHG | DIASTOLIC BLOOD PRESSURE: 50 MMHG

## 2022-01-14 VITALS — DIASTOLIC BLOOD PRESSURE: 44 MMHG | SYSTOLIC BLOOD PRESSURE: 129 MMHG

## 2022-01-14 LAB
ALBUMIN SERPL-MCNC: 3.1 G/DL (ref 3.4–5)
ALP SERPL-CCNC: 62 U/L (ref 46–116)
ALT SERPL-CCNC: 20 U/L (ref 30–65)
ANION GAP SERPL CALC-SCNC: 7 MMOL/L (ref 7–16)
APTT BLD: 24.1 SECONDS (ref 25–31.3)
AST SERPL-CCNC: 12 U/L (ref 15–37)
BILIRUB SERPL-MCNC: 0.4 MG/DL
BUN SERPL-MCNC: 14 MG/DL (ref 7–18)
CALCIUM SERPL-MCNC: 8.8 MG/DL (ref 8.5–10.1)
CHLORIDE SERPL-SCNC: 101 MMOL/L (ref 98–107)
CO2 SERPL-SCNC: 33 MMOL/L (ref 21–32)
CREAT SERPL-MCNC: 0.9 MG/DL (ref 0.6–1.3)
GLUCOSE SERPL-MCNC: 172 MG/DL (ref 70–99)
HCT VFR BLD CALC: 34 % (ref 37–47)
HGB BLD-MCNC: 10.9 GM/DL (ref 12–15)
INR PPP: 1.1
MAGNESIUM SERPL-MCNC: 2.1 MG/DL (ref 1.8–2.4)
MCH RBC QN AUTO: 29.4 PG (ref 26–34)
MCHC RBC AUTO-ENTMCNC: 32.2 G/DL (ref 28–37)
MCV RBC: 91.2 FL (ref 80–100)
MPV: 8.1 FL. (ref 7.2–11.1)
PHOSPHATE SERPL-MCNC: 3.5 MG/DL (ref 2.5–4.9)
PLATELET COUNT*: 263 THOU/UL (ref 150–400)
POTASSIUM SERPL-SCNC: 4 MMOL/L (ref 3.5–5.1)
PROT SERPL-MCNC: 6.9 G/DL (ref 6.4–8.2)
PROTHROMBIN TIME: 10.9 SECONDS (ref 9.2–11.5)
RBC # BLD AUTO: 3.72 MIL/UL (ref 4.2–5)
RDW-CV: 14.5 % (ref 10.5–14.5)
SODIUM SERPL-SCNC: 141 MMOL/L (ref 136–145)
WBC # BLD AUTO: 8.5 THOU/UL (ref 4–11)

## 2022-01-14 PROCEDURE — 5A0935A ASSISTANCE WITH RESPIRATORY VENTILATION, LESS THAN 24 CONSECUTIVE HOURS, HIGH NASAL FLOW/VELOCITY: ICD-10-PCS

## 2022-01-14 PROCEDURE — 5A09357 ASSISTANCE WITH RESPIRATORY VENTILATION, LESS THAN 24 CONSECUTIVE HOURS, CONTINUOUS POSITIVE AIRWAY PRESSURE: ICD-10-PCS | Performed by: INTERNAL MEDICINE

## 2022-01-14 NOTE — EKG
[place] [address]
Phone:  [phone]                     ELECTROCARDIOGRAM REPORT      
_______________________________________________________________________________
 
Name:         KVNG VILLEDA            Room:          [address]    [place] 
M.R.#:    [id number]     Account #:     [id number]  
Admission:    01/10/22    Attend Phys:   Ruddy Quiroga, 
Discharge:                Date of Birth: [birth year]  
Date of Service: 22 1524  Report #:      [id number]
        [id number]
_______________________________________________________________________________
THIS REPORT FOR:  //name//                      
 
                          [facility]
                                       
Test Date:    2022               Test Time:    15:24:23
Pat Name:     KVNG VILLEDA           Department:   
Patient ID:   [id number]            Room:         [address]
Gender:       F                        Technician:   -
:          1950               Requested By: Ron Benson
Order Number: [id number]    Alesha MD:   Orville Hogan
                                 Measurements
Intervals                              Axis          
Rate:         79                       P:            80
LA:           140                      QRS:          74
QRSD:         133                      T:            -32
QT:           421                                    
QTc:          483                                    
                           Interpretive Statements
Sinus rhythm
Probable left atrial enlargement
Right bundle branch block
Compared to ECG 2022 10:12:34
No significant interval change
Electronically Signed On 2022 11:22:16 CST by Orville Hogan
[email or web address]
 
 
 
 
 
 
 
 
 
 
 
 
 
 
 
 
 
 
 
  <ELECTRONICALLY SIGNED>
                                           By: Orville Hogan MD, [facility]     
  22     1122
D: 22 1524   _____________________________________
T: 22 1524   Orville Hogan MD, [facility]       /EPI

## 2022-01-14 NOTE — CARD
[address]  [address]                    CARDIAC CATH REPORT           
_______________________________________________________________________________
 
Name:       KVNG VILLEDA ANGELINA             Room:           [address] [place]  
.R.#:  [id number]      Account #:      [id number]  
Admission:  01/10/22     Attend Phys:    Ruddy Quiroga MD 
Discharge:               Date of Birth:  [birth year]  
         Report #: [id number]
                                                                     [id number]
_______________________________________________________________________________
THIS REPORT FOR:  
 
cc:  Karlos Iraheta Brad DO Liston, Michael J. MD [facility]                                         ~
 
 
--------------- APPROVED REPORT --------------
 
 
Study performed:  01/11/2022 15:10:36
 
Patient Details
Patient Status: In-Patient                  Room #: 
The patient is a 71 year-old female
 
Event Personnel
Ron Benson  Cardiologist, Angie Gonzalez Kramer, Jessie RTR Monitor, Ana Rodas RN RN
 
Procedures Performed
Art Access - R radial artery  Coronary Angiography Only Hemostasis 
with Hemoband
 
Admission/Lab Medications/Medications given during 
procedure
Lidocaine Subcut 4 ml, Nitroglycerin  mcg, Verapamil IA 2.5 mg, 
0.9% Sodium Chloride IV 75 ml per hr, Nitroglycerin  mcg, 
Verapamil IA 1.25 mg
 
Procedure Narrative
The patient was brought urgently to the Cardiac Catheterization 
Laboratory and was prepped and draped in a sterile manner. The right 
wrist was infiltrated with 2% Lidocaine subcutaneous anesthesia. IV 
conscious sedation was used throughout procedure with appropriate 
monitoring and was performed in the presence of a registered nurse 
who was an independent trained observer other than the physician 
performing the procedure. A Slender [place] sheath was inserted 
into the right radial artery. Coronary angiography was performed 
using coronary diagnostic catheters. The right coronary system was 
accessed and visualized with a Diagnostic 6 Fr JR 4 catheter. The 
left coronary system was accessed and visualized with a Diagnostic 6 
Fr JL 4 catheter. Closure device was deployed with a Fr Vasc-Band Reg 
24cm. The patient tolerated the procedure well and there were no 
complications associated with the procedure. There was no 
hematoma.
 
 
 
[place] [address]                    CARDIAC CATH REPORT           
_______________________________________________________________________________
 
Name:       KVNG VILLEDA             Room:           [address] [place]  
[facility]#:  [id number]      Account #:      [id number]  
Admission:  01/10/22     Attend Phys:    Ruddy Quiroga MD 
Discharge:               Date of Birth:  [birth year]  
         Report #: [id number]
                                                                     [id number]
_______________________________________________________________________________
 
 
Intraoperative Conscious Sedation
Sedation start time:  16:06           Case end Time:  
16:22    
Fentanyl  50 mcg    Versed  2 mg  
 
Fluoro Time:    1.7 minutes    
Dose:     DAP 41817 cGycm2  636 mGy  
Contrast Type and Amount:  Visipaque 80 mL    
 
Coronary Angiography
The patient's coronary anatomy is right dominant. 
 
Diagnostic Cath
Left Main The left main coronary artery is free of significant 
disease and bifurcates into a left anterior sending and circumflex 
coronary artery.
LAD  The left intradescending coronary artery exhibits mild 20% 
plaquing proximally.  The mid and distal portion of the vessel are 
free of significant disease.
Diagonal 1 The first diagonal branch is minimally plaqued.
Diagonal 2 The second diagonal branch is minimally 
plaqued.
Circumflex The circumflex coronary artery has approximately 60 to 70% 
narrowing proximal to and distal to a stent.  The remainder the 
vessel is free of significant disease.
OM1  A large branched first obtuse marginal branch is free of 
significant disease.
OM2  A large branch second obtuse marginal branch is free of 
significant disease.
Right Coronary The right coronary artery has a 95% stenosis in the 
midportion at the takeoff of the acute marginal branch with a 
eccentric calcified plaque noted.  Distally there are widely patent 
stents.
R PDA  The right PDA has a 50% narrowing in its midportion.
RPLV  The right posterior lateral LV branch has a 20% narrowing in 
its midportion.
 
Left Ventriculography
Left Ventriculography was not performed.     
 
Conclusion
1.  Significant two-vessel coronary artery disease as outlined 
above.
2.  Critical stenoses noted in the mid right coronary artery.
 
 
 
[place] [address]                    CARDIAC CATH REPORT           
_______________________________________________________________________________
 
Name:       KVNG VILLEDA             Room:           [address] [place]  
[facility]#:  [id number]      Account #:      [id number]  
Admission:  01/10/22     Attend Phys:    Ruddy Quiroga MD 
Discharge:               Date of Birth:  [birth year]  
         Report #: [id number]
                                                                     [id number]
_______________________________________________________________________________
 
3.  Moderate stenoses noted in the mid circumflex coronary artery. 
 
Recommendations
1.  Continue aggressive risk factor modification.
2.  Consider percutaneous coronary intervention to the right coronary 
artery followed by possible intervention to the circumflex coronary 
artery.
 
 
 
 
 
 
 
 
 
 
 
 
 
 
 
 
 
 
 
 
 
 
 
 
 
 
 
 
 
 
 
 
 
 
 
 
<ELECTRONICALLY SIGNED>
                                        By:  Ron Benson MD, FACC   
01/14/22 1755
D: 01/14/22 1755_______________________________________
T: 01/14/22 1755Michaesuha Benson MD, FACC      /INF

## 2022-01-14 NOTE — 2DMMODE
[place] [address]
Phone:  [phone] 2 D/M-MODE ECHOCARDIOGRAM     
_______________________________________________________________________________
 
Name:         KVNG VILLEDA            Room:          [address] [place] 
SUSHMA.#:    [id number]     Account #:     [id number]  
Admission:    01/10/22    Attend Phys:   Ruddy Quiroga, 
Discharge:                Date of Birth: [birth year]  
Date of Service: 22 1511  Report #:      [id number]
        [id number]
_______________________________________________________________________________
THIS REPORT FOR:
 
cc:  Karlos Iraheta Brad DO Holkins,Orville VILLA MD [facility]       
                                                                       ~
 
--------------- APPROVED REPORT --------------
 
 
Study performed:  2022 14:21:02
 
EXAM: Comprehensive 2D, Doppler, and color-flow 
Echocardiogram 
Patient Location: In-Patient   
Room #:  [facility]     Status:  routine
 
      BSA:         2.01
HR: 64 bpm BP:          128/45 mmHg 
Rhythm: NSR     
 
Other Information 
Study Quality: Good
 
Indications
Non STEMI
 
2D Dimensions
IVSd:  12.28 (7-11mm) LVOT Diam:  18.58 (18-24mm) 
LVDd:  49.11 mm  
PWd:  11.47 (7-11mm) Ascending Ao:  33.27 (22-36mm)
LVDs:  23.39 (25-40mm) 
Aortic Root:  27.31 mm 
 
Volumes
Left Atrial Volume (Systole) 
    LA ESV Index:  43.60 mL/m2
 
Aortic Valve
AoV Peak Christopher.:  1.67 m/s 
AO Peak Gr.:  11.13 mmHg LVOT Max P.00 mmHg
AO Mean Gr.:  6.11 mmHg  LVOT Mean PG:  3.13 mmHg
    LVOT Max V:  1.32 m/s
AO V2 VTI:  35.17 cm  LVOT Mean V:  0.80 m/s
MIYA (VTI):  2.28 cm2  LVOT V1 VTI:  29.64 cm
 
 
 
[place] [address]
Phone:  [phone]                     2 D/M-MODE ECHOCARDIOGRAM     
_______________________________________________________________________________
 
Name:         KVNG VILLEDA            Room:          [address] [place] 
.R.#:    [id number]     Account #:     [id number]  
Admission:    01/10/22    Attend Phys:   Ruddy Quiroga, 
Discharge:                Date of Birth: [birth year]  
Date of Service: 22 1511  Report #:      [id number]
        [id number]
_______________________________________________________________________________
Mitral Valve
    E/A Ratio:  1.99
    MV Decel. Time:  184.40 ms
MV E Max Christopher.:  1.07 m/s 
MV PHT:  53.48 ms  
MVA (PHT):  4.11 cm2  
 
TDI
E/Lateral E':  9.73 E/Medial E':  11.89
   Medial E' Christopher.:  0.09 m/s
   Lateral E' Christopher.:  0.11 m/s
 
Pulmonary Valve
PV Peak Christopher.:  1.13 m/s PV Peak Gr.:  5.11 mmHg
 
Tricuspid Valve
    RAP Estimate:  5.00 mmHg
TR Peak Gr.:  31.61 mmHg RVSP:  36.00 mmHg
    PA Pressure:  36.00 mmHg
 
Left Ventricle
The left ventricle is normal size. There is normal LV segmental wall 
motion. Mild concentric left ventricular hypertrophy. Left 
ventricular systolic function is normal. The left ventricular 
ejection fraction is within the normal range. LVEF is 60-65%. Grade 
IV - fixed restrictive diastolic dysfunction.
 
Right Ventricle
The right ventricle is normal size. The right ventricular systolic 
function is normal.
 
Atria
Left atrium is mildly dilated. The right atrium size is 
normal.
 
Aortic Valve
Mild aortic valve sclerosis. No aortic regurgitation is present. 
There is no aortic valvular stenosis.
 
Mitral Valve
Mild mitral annular calcification. Mild mitral regurgitation. No 
evidence of mitral valve stenosis.
 
Tricuspid Valve
The tricuspid valve is normal in structure. Mild tricuspid 
regurgitation. Mild pulmonary hypertension.
 
 
[place] [address]
Phone:  [phone] 2 D/M-MODE ECHOCARDIOGRAM     
_______________________________________________________________________________
 
Name:         KIMBERLY VILLEDAABHINAV ALFARO            Room:          [address] [place] 
[facility]#:    [id number]     Account #:     [id number]  
Admission:    01/10/22    Attend Phys:   Ruddy Quiroga, 
Discharge:                Date of Birth: [birth year]  
Date of Service: 22 1511  Report #:      [id number]
        [id number]
_______________________________________________________________________________
 
Pulmonic Valve
The pulmonary valve is normal in structure. There is no pulmonic 
valvular regurgitation.
 
Great Vessels
The aortic root is normal in size. IVC is normal in size and 
collapses >50% with inspiration.
 
Pericardium
There is no pericardial effusion.
 
<Conclusion>
The left ventricle is normal size.
Mild concentric left ventricular hypertrophy.
Left ventricular systolic function is normal.
The left ventricular ejection fraction is within the normal 
range.
LVEF is 60-65%.
Grade IV - fixed restrictive diastolic dysfunction.
The right ventricle is normal size.
Left atrium is mildly dilated.
The right atrium size is normal.
Mild aortic valve sclerosis.
No aortic regurgitation is present.
There is no aortic valvular stenosis.
Mild mitral annular calcification.
Mild mitral regurgitation.
No evidence of mitral valve stenosis.
The tricuspid valve is normal in structure.
Mild tricuspid regurgitation.
Mild pulmonary hypertension.
IVC is normal in size and collapses >50% with inspiration.
There is no pericardial effusion.
There is normal LV segmental wall motion.
 
 
 
 
 
 
 
 
 
  <ELECTRONICALLY SIGNED>
                                           By: Orville Hogan MD, FACC     
  22
D: 22   _____________________________________
T: 22   Orville Hogan MD, FACC       /INF

## 2022-01-15 VITALS — DIASTOLIC BLOOD PRESSURE: 54 MMHG | SYSTOLIC BLOOD PRESSURE: 118 MMHG

## 2022-01-15 VITALS — DIASTOLIC BLOOD PRESSURE: 53 MMHG | SYSTOLIC BLOOD PRESSURE: 126 MMHG

## 2022-01-15 VITALS — SYSTOLIC BLOOD PRESSURE: 127 MMHG | DIASTOLIC BLOOD PRESSURE: 51 MMHG

## 2022-01-15 VITALS — DIASTOLIC BLOOD PRESSURE: 44 MMHG | SYSTOLIC BLOOD PRESSURE: 116 MMHG

## 2022-01-15 VITALS — SYSTOLIC BLOOD PRESSURE: 121 MMHG | DIASTOLIC BLOOD PRESSURE: 67 MMHG

## 2022-01-15 VITALS — DIASTOLIC BLOOD PRESSURE: 51 MMHG | SYSTOLIC BLOOD PRESSURE: 127 MMHG

## 2022-01-15 LAB
ANION GAP SERPL CALC-SCNC: 4 MMOL/L (ref 7–16)
BUN SERPL-MCNC: 16 MG/DL (ref 7–18)
CALCIUM SERPL-MCNC: 8.6 MG/DL (ref 8.5–10.1)
CHLORIDE SERPL-SCNC: 103 MMOL/L (ref 98–107)
CO2 SERPL-SCNC: 34 MMOL/L (ref 21–32)
CREAT SERPL-MCNC: 1.1 MG/DL (ref 0.6–1.3)
GLUCOSE SERPL-MCNC: 242 MG/DL (ref 70–99)
HCT VFR BLD CALC: 31.6 % (ref 37–47)
HGB BLD-MCNC: 10.2 GM/DL (ref 12–15)
MCH RBC QN AUTO: 29.9 PG (ref 26–34)
MCHC RBC AUTO-ENTMCNC: 32.2 G/DL (ref 28–37)
MCV RBC: 92.8 FL (ref 80–100)
MPV: 8.4 FL. (ref 7.2–11.1)
PLATELET COUNT*: 257 THOU/UL (ref 150–400)
POTASSIUM SERPL-SCNC: 4.2 MMOL/L (ref 3.5–5.1)
RBC # BLD AUTO: 3.41 MIL/UL (ref 4.2–5)
RDW-CV: 14.8 % (ref 10.5–14.5)
SODIUM SERPL-SCNC: 141 MMOL/L (ref 136–145)
WBC # BLD AUTO: 8 THOU/UL (ref 4–11)

## 2022-01-18 NOTE — EKG
[place] [address]
Phone:  [phone]                     ELECTROCARDIOGRAM REPORT      
_______________________________________________________________________________
 
Name:         KVNG VILLEDA            Room:          [address]    [place] 
M.R.#:    [id number]     Account #:     [id number]  
Admission:    01/10/22    Attend Phys:   Ruddy Quiroga, 
Discharge:    01/15/22    Date of Birth: [birth year]  
Date of Service: 22 [facility]  Report #:      [id number]
        [id number]
_______________________________________________________________________________
THIS REPORT FOR:  //name//                      
 
                          [facility]
                                       
Test Date:    2022               Test Time:    14:22:54
Pat Name:     KVNG VILLEDA           Department:   
Patient ID:   [id number]            Room:         [address]
Gender:       F                        Technician:   
:          1950               Requested By: Ron Benson
Order Number: [id number]    Alesha MD:   Erick Ann
                                 Measurements
Intervals                              Axis          
Rate:         65                       P:            64
NY:           128                      QRS:          69
QRSD:         135                      T:            -37
QT:           464                                    
QTc:          483                                    
                           Interpretive Statements
Sinus rhythm
Probable left atrial enlargement
Right bundle branch block
Compared to ECG 2022 15:24:23
No significant changes
Electronically Signed On 2022 10:08:23 CST by Erick Ann
[email or web address]
 
 
 
 
 
 
 
 
 
 
 
 
 
 
 
 
 
 
 
  <ELECTRONICALLY SIGNED>
                                           By: Erick Ann MD, FAC      
  22     1008
D: 22 142   _____________________________________
T: 22 142   Erick Ann MD, [facility]        /EPI

## 2022-02-15 ENCOUNTER — HOSPITAL ENCOUNTER (INPATIENT)
Dept: HOSPITAL 96 - M.ERS | Age: 72
LOS: 3 days | Discharge: HOME HEALTH | DRG: 189 | End: 2022-02-18
Attending: STUDENT IN AN ORGANIZED HEALTH CARE EDUCATION/TRAINING PROGRAM | Admitting: STUDENT IN AN ORGANIZED HEALTH CARE EDUCATION/TRAINING PROGRAM
Payer: COMMERCIAL

## 2022-02-15 VITALS — HEIGHT: 60.98 IN | WEIGHT: 230 LBS | BODY MASS INDEX: 43.43 KG/M2

## 2022-02-15 VITALS — SYSTOLIC BLOOD PRESSURE: 162 MMHG | DIASTOLIC BLOOD PRESSURE: 73 MMHG

## 2022-02-15 VITALS — DIASTOLIC BLOOD PRESSURE: 54 MMHG | SYSTOLIC BLOOD PRESSURE: 102 MMHG

## 2022-02-15 VITALS — DIASTOLIC BLOOD PRESSURE: 80 MMHG | SYSTOLIC BLOOD PRESSURE: 153 MMHG

## 2022-02-15 VITALS — SYSTOLIC BLOOD PRESSURE: 146 MMHG | DIASTOLIC BLOOD PRESSURE: 51 MMHG

## 2022-02-15 DIAGNOSIS — I10: ICD-10-CM

## 2022-02-15 DIAGNOSIS — E66.01: ICD-10-CM

## 2022-02-15 DIAGNOSIS — I50.32: ICD-10-CM

## 2022-02-15 DIAGNOSIS — J44.1: ICD-10-CM

## 2022-02-15 DIAGNOSIS — Z82.49: ICD-10-CM

## 2022-02-15 DIAGNOSIS — J44.0: ICD-10-CM

## 2022-02-15 DIAGNOSIS — Z90.49: ICD-10-CM

## 2022-02-15 DIAGNOSIS — Z90.710: ICD-10-CM

## 2022-02-15 DIAGNOSIS — J96.01: Primary | ICD-10-CM

## 2022-02-15 DIAGNOSIS — I25.2: ICD-10-CM

## 2022-02-15 DIAGNOSIS — Z20.822: ICD-10-CM

## 2022-02-15 DIAGNOSIS — Z95.5: ICD-10-CM

## 2022-02-15 DIAGNOSIS — E11.9: ICD-10-CM

## 2022-02-15 DIAGNOSIS — Z87.891: ICD-10-CM

## 2022-02-15 DIAGNOSIS — J40: ICD-10-CM

## 2022-02-15 LAB
ABSOLUTE BASOPHILS: 0 THOU/UL (ref 0–0.2)
ABSOLUTE EOSINOPHILS: 0.1 THOU/UL (ref 0–0.7)
ABSOLUTE MONOCYTES: 0.3 THOU/UL (ref 0–1.2)
ALBUMIN SERPL-MCNC: 3.3 G/DL (ref 3.4–5)
ALP SERPL-CCNC: 75 U/L (ref 46–116)
ALT SERPL-CCNC: 22 U/L (ref 30–65)
ANION GAP SERPL CALC-SCNC: 4 MMOL/L (ref 7–16)
AST SERPL-CCNC: 17 U/L (ref 15–37)
BASOPHILS NFR BLD AUTO: 0.6 %
BE: -0.3 MMOL/L
BILIRUB SERPL-MCNC: 0.3 MG/DL
BUN SERPL-MCNC: 15 MG/DL (ref 7–18)
CALCIUM SERPL-MCNC: 8.5 MG/DL (ref 8.5–10.1)
CHLORIDE SERPL-SCNC: 101 MMOL/L (ref 98–107)
CO2 SERPL-SCNC: 31 MMOL/L (ref 21–32)
CREAT SERPL-MCNC: 1.1 MG/DL (ref 0.6–1.3)
EOSINOPHIL NFR BLD: 0.9 %
GLUCOSE SERPL-MCNC: 217 MG/DL (ref 70–99)
GRANULOCYTES NFR BLD MANUAL: 78.7 %
HCT VFR BLD CALC: 35 % (ref 37–47)
HGB BLD-MCNC: 11.2 GM/DL (ref 12–15)
LIPASE: 175 U/L (ref 73–393)
LYMPHOCYTES # BLD: 1.1 THOU/UL (ref 0.8–5.3)
LYMPHOCYTES NFR BLD AUTO: 15.2 %
MAGNESIUM SERPL-MCNC: 1.9 MG/DL (ref 1.8–2.4)
MCH RBC QN AUTO: 29.6 PG (ref 26–34)
MCHC RBC AUTO-ENTMCNC: 31.9 G/DL (ref 28–37)
MCV RBC: 93 FL (ref 80–100)
MONOCYTES NFR BLD: 4.6 %
MPV: 7.8 FL. (ref 7.2–11.1)
NEUTROPHILS # BLD: 5.6 THOU/UL (ref 1.6–8.1)
NT-PRO BRAIN NAT PEPTIDE: 1258 PG/ML (ref ?–300)
NUCLEATED RBCS: 0 /100WBC
PCO2 BLD: 62.2 MMHG (ref 35–45)
PLATELET COUNT*: 288 THOU/UL (ref 150–400)
PO2 BLD: 213.3 MMHG (ref 75–100)
POTASSIUM SERPL-SCNC: 4.6 MMOL/L (ref 3.5–5.1)
PROT SERPL-MCNC: 6.8 G/DL (ref 6.4–8.2)
RBC # BLD AUTO: 3.76 MIL/UL (ref 4.2–5)
RDW-CV: 15.7 % (ref 10.5–14.5)
SODIUM SERPL-SCNC: 136 MMOL/L (ref 136–145)
WBC # BLD AUTO: 7.1 THOU/UL (ref 4–11)

## 2022-02-15 PROCEDURE — 5A09357 ASSISTANCE WITH RESPIRATORY VENTILATION, LESS THAN 24 CONSECUTIVE HOURS, CONTINUOUS POSITIVE AIRWAY PRESSURE: ICD-10-PCS | Performed by: INTERNAL MEDICINE

## 2022-02-15 NOTE — EKG
[place] [address]
Phone:  [phone]                     ELECTROCARDIOGRAM REPORT      
_______________________________________________________________________________
 
Name:         KVNG VILLEDA            Room:          [place] [address]   [place] 
[facility]#:    [id number]     Account #:     [id number]  
Admission:    02/15/22    Attend Phys:   Marcus Salgado
Discharge:                Date of Birth: [birth year]  
Date of Service: 02/15/22 1137  Report #:      [id number]
        [id number]
_______________________________________________________________________________
THIS REPORT FOR:  //name//                      
 
                         [facility] ED
                                       
Test Date:    2022-02-15               Test Time:    11:37:31
Pat Name:     KVNG VILLEDA           Department:   
Patient ID:   [id number]            Room:         [facility]
Gender:       F                        Technician:   MAYLIN
:          1950               Requested By: Kurt Linda
Order Number: [id number] MD:   Erick Ann
                                 Measurements
Intervals                              Axis          
Rate:         81                       P:            47
HI:           141                      QRS:          106
QRSD:         145                      T:            0
QT:           406                                    
QTc:          472                                    
                           Interpretive Statements
Sinus rhythm
RBBB and LPFB
Baseline wander in lead(s) V6
Compared to ECG 2022 14:22:54
no change
Electronically Signed On 2- 15:51:47 CST by Erick Ann
[email or web address]
 
 
 
 
 
 
 
 
 
 
 
 
 
 
 
 
 
 
 
  <ELECTRONICALLY SIGNED>
                                           By: Erick Ann MD, [facility]      
  02/15/22     1551
D: 02/15/22 1137   _____________________________________
T: 02/15/22 1137   Erick Ann MD, [facility]        /EPI

## 2022-02-16 VITALS — DIASTOLIC BLOOD PRESSURE: 48 MMHG | SYSTOLIC BLOOD PRESSURE: 137 MMHG

## 2022-02-16 VITALS — DIASTOLIC BLOOD PRESSURE: 64 MMHG | SYSTOLIC BLOOD PRESSURE: 109 MMHG

## 2022-02-16 VITALS — DIASTOLIC BLOOD PRESSURE: 75 MMHG | SYSTOLIC BLOOD PRESSURE: 169 MMHG

## 2022-02-16 VITALS — DIASTOLIC BLOOD PRESSURE: 60 MMHG | SYSTOLIC BLOOD PRESSURE: 143 MMHG

## 2022-02-16 VITALS — SYSTOLIC BLOOD PRESSURE: 136 MMHG | DIASTOLIC BLOOD PRESSURE: 57 MMHG

## 2022-02-16 VITALS — DIASTOLIC BLOOD PRESSURE: 57 MMHG | SYSTOLIC BLOOD PRESSURE: 147 MMHG

## 2022-02-17 VITALS — SYSTOLIC BLOOD PRESSURE: 145 MMHG | DIASTOLIC BLOOD PRESSURE: 56 MMHG

## 2022-02-17 VITALS — SYSTOLIC BLOOD PRESSURE: 129 MMHG | DIASTOLIC BLOOD PRESSURE: 53 MMHG

## 2022-02-17 VITALS — DIASTOLIC BLOOD PRESSURE: 70 MMHG | SYSTOLIC BLOOD PRESSURE: 159 MMHG

## 2022-02-17 VITALS — SYSTOLIC BLOOD PRESSURE: 171 MMHG | DIASTOLIC BLOOD PRESSURE: 79 MMHG

## 2022-02-17 VITALS — DIASTOLIC BLOOD PRESSURE: 57 MMHG | SYSTOLIC BLOOD PRESSURE: 140 MMHG

## 2022-02-17 VITALS — DIASTOLIC BLOOD PRESSURE: 57 MMHG | SYSTOLIC BLOOD PRESSURE: 132 MMHG

## 2022-02-17 LAB
ANION GAP SERPL CALC-SCNC: 3 MMOL/L (ref 7–16)
BUN SERPL-MCNC: 21 MG/DL (ref 7–18)
CALCIUM SERPL-MCNC: 9 MG/DL (ref 8.5–10.1)
CHLORIDE SERPL-SCNC: 101 MMOL/L (ref 98–107)
CO2 SERPL-SCNC: 34 MMOL/L (ref 21–32)
CREAT SERPL-MCNC: 0.8 MG/DL (ref 0.6–1.3)
GLUCOSE SERPL-MCNC: 189 MG/DL (ref 70–99)
HCT VFR BLD CALC: 32.9 % (ref 37–47)
HGB BLD-MCNC: 10.7 GM/DL (ref 12–15)
MCH RBC QN AUTO: 29.8 PG (ref 26–34)
MCHC RBC AUTO-ENTMCNC: 32.5 G/DL (ref 28–37)
MCV RBC: 91.8 FL (ref 80–100)
MPV: 8.2 FL. (ref 7.2–11.1)
PLATELET COUNT*: 299 THOU/UL (ref 150–400)
POTASSIUM SERPL-SCNC: 4.5 MMOL/L (ref 3.5–5.1)
RBC # BLD AUTO: 3.59 MIL/UL (ref 4.2–5)
RDW-CV: 15.4 % (ref 10.5–14.5)
SODIUM SERPL-SCNC: 138 MMOL/L (ref 136–145)
WBC # BLD AUTO: 9.8 THOU/UL (ref 4–11)

## 2022-02-18 VITALS — DIASTOLIC BLOOD PRESSURE: 50 MMHG | SYSTOLIC BLOOD PRESSURE: 150 MMHG

## 2022-02-18 VITALS — DIASTOLIC BLOOD PRESSURE: 52 MMHG | SYSTOLIC BLOOD PRESSURE: 146 MMHG

## 2022-02-18 VITALS — DIASTOLIC BLOOD PRESSURE: 68 MMHG | SYSTOLIC BLOOD PRESSURE: 168 MMHG

## 2022-02-18 VITALS — DIASTOLIC BLOOD PRESSURE: 69 MMHG | SYSTOLIC BLOOD PRESSURE: 118 MMHG

## 2022-02-18 VITALS — SYSTOLIC BLOOD PRESSURE: 146 MMHG | DIASTOLIC BLOOD PRESSURE: 52 MMHG

## 2022-02-18 LAB
ANION GAP SERPL CALC-SCNC: 6 MMOL/L (ref 7–16)
BUN SERPL-MCNC: 23 MG/DL (ref 7–18)
CALCIUM SERPL-MCNC: 8.7 MG/DL (ref 8.5–10.1)
CHLORIDE SERPL-SCNC: 102 MMOL/L (ref 98–107)
CO2 SERPL-SCNC: 32 MMOL/L (ref 21–32)
CREAT SERPL-MCNC: 1 MG/DL (ref 0.6–1.3)
EST. AVERAGE GLUCOSE BLD GHB EST-MCNC: 177 MG/DL
GLUCOSE SERPL-MCNC: 264 MG/DL (ref 70–99)
GLYCOHEMOGLOBIN (HGB A1C): 7.8 % (ref 4.8–5.6)
HCT VFR BLD CALC: 35.6 % (ref 37–47)
HGB BLD-MCNC: 11.4 GM/DL (ref 12–15)
MCH RBC QN AUTO: 29.6 PG (ref 26–34)
MCHC RBC AUTO-ENTMCNC: 32 G/DL (ref 28–37)
MCV RBC: 92.5 FL (ref 80–100)
MPV: 8.3 FL. (ref 7.2–11.1)
PLATELET COUNT*: 304 THOU/UL (ref 150–400)
POTASSIUM SERPL-SCNC: 4.6 MMOL/L (ref 3.5–5.1)
RBC # BLD AUTO: 3.85 MIL/UL (ref 4.2–5)
RDW-CV: 15.1 % (ref 10.5–14.5)
SODIUM SERPL-SCNC: 140 MMOL/L (ref 136–145)
WBC # BLD AUTO: 8.2 THOU/UL (ref 4–11)